# Patient Record
Sex: FEMALE | Race: BLACK OR AFRICAN AMERICAN | NOT HISPANIC OR LATINO
[De-identification: names, ages, dates, MRNs, and addresses within clinical notes are randomized per-mention and may not be internally consistent; named-entity substitution may affect disease eponyms.]

---

## 2021-02-03 ENCOUNTER — DASHBOARD ENCOUNTERS (OUTPATIENT)
Age: 51
End: 2021-02-03

## 2021-02-03 ENCOUNTER — OFFICE VISIT (OUTPATIENT)
Dept: URBAN - METROPOLITAN AREA CLINIC 78 | Facility: CLINIC | Age: 51
End: 2021-02-03
Payer: COMMERCIAL

## 2021-02-03 DIAGNOSIS — R10.13 EPIGASTRIC PAIN: ICD-10-CM

## 2021-02-03 DIAGNOSIS — K59.09 CONSTIPATION - FUNCTIONAL: ICD-10-CM

## 2021-02-03 DIAGNOSIS — Z86.010 PERSONAL HISTORY OF COLON POLYPS: ICD-10-CM

## 2021-02-03 PROBLEM — 428283002 HISTORY OF POLYP OF COLON: Status: ACTIVE | Noted: 2021-02-03

## 2021-02-03 PROCEDURE — G8483 FLU IMM NO ADMIN DOC REA: HCPCS | Performed by: INTERNAL MEDICINE

## 2021-02-03 PROCEDURE — 99214 OFFICE O/P EST MOD 30 MIN: CPT | Performed by: INTERNAL MEDICINE

## 2021-02-03 RX ORDER — LINACLOTIDE 145 UG/1
1 CAPSULE AT LEAST 30 MINUTES BEFORE THE FIRST MEAL OF THE DAY ON AN EMPTY STOMACH CAPSULE, GELATIN COATED ORAL ONCE A DAY
Qty: 90 | Refills: 0 | OUTPATIENT
Start: 2021-02-03

## 2021-02-03 NOTE — HPI-TODAY'S VISIT:
The patient elicits having abdominal pain.Epigastric pain since high school  Patient describes the location of abdominal pain as: Epigastric   Triggers are alcohol, senna tea , caffiene   She does colon cleanse .. or smooth move tea  All that hurt   Pain is irritated epigastric or irritated  Not cramping   She is laxative dependent   Denies NSAID use  Takes Tylenol  ASA hurts her stomach   Constipation , longstanding history

## 2021-02-05 ENCOUNTER — OFFICE VISIT (OUTPATIENT)
Dept: URBAN - METROPOLITAN AREA SURGERY CENTER 15 | Facility: SURGERY CENTER | Age: 51
End: 2021-02-05

## 2024-10-16 ENCOUNTER — LAB OUTSIDE AN ENCOUNTER (OUTPATIENT)
Dept: URBAN - METROPOLITAN AREA SURGERY CENTER 15 | Facility: SURGERY CENTER | Age: 54
End: 2024-10-16

## 2024-10-29 ENCOUNTER — OFFICE VISIT (OUTPATIENT)
Dept: URBAN - METROPOLITAN AREA SURGERY CENTER 15 | Facility: SURGERY CENTER | Age: 54
End: 2024-10-29

## 2024-10-31 ENCOUNTER — OFFICE VISIT (OUTPATIENT)
Dept: URBAN - METROPOLITAN AREA SURGERY CENTER 15 | Facility: SURGERY CENTER | Age: 54
End: 2024-10-31

## 2025-01-29 ENCOUNTER — OFFICE VISIT (OUTPATIENT)
Dept: URBAN - METROPOLITAN AREA CLINIC 78 | Facility: CLINIC | Age: 55
End: 2025-01-29

## 2025-01-29 VITALS
TEMPERATURE: 98 F | HEIGHT: 61 IN | HEART RATE: 61 BPM | SYSTOLIC BLOOD PRESSURE: 113 MMHG | WEIGHT: 155 LBS | BODY MASS INDEX: 29.27 KG/M2 | DIASTOLIC BLOOD PRESSURE: 78 MMHG

## 2025-01-29 DIAGNOSIS — K64.8 OTHER HEMORRHOIDS: ICD-10-CM

## 2025-01-29 DIAGNOSIS — Z86.0101 PERSONAL HISTORY OF ADENOMATOUS AND SERRATED COLON POLYPS: ICD-10-CM

## 2025-01-29 PROCEDURE — 99213 OFFICE O/P EST LOW 20 MIN: CPT | Performed by: INTERNAL MEDICINE

## 2025-01-29 RX ORDER — LINACLOTIDE 145 UG/1
1 CAPSULE AT LEAST 30 MINUTES BEFORE THE FIRST MEAL OF THE DAY ON AN EMPTY STOMACH CAPSULE, GELATIN COATED ORAL ONCE A DAY
Qty: 90 | Refills: 0 | Status: ACTIVE | COMMUNITY
Start: 2021-02-03

## 2025-01-29 NOTE — HPI-TODAY'S VISIT:
- Presents for discussion of hemorrhoids and follow-up of recent colonoscopy findings. - Reports external hemorrhoids that can be pushed back in. - Experiences pressure sensation. - Recent episode of severe irritation after application of [place] topical balm during car trip to [place]: required sitz baths for relief, used donut cushion for comfort. - No current use of hemorrhoid creams since that episode.  - Colonoscopy findings: High-definition endoscopy revealed prominent hemorrhoids and a small precancerous polyp. - Retroflexed view confirmed hemorrhoid presence. - Pathology: Precancerous polyp identified.